# Patient Record
(demographics unavailable — no encounter records)

---

## 2024-10-25 NOTE — REASON FOR VISIT
[Consultation] : consultation for [FreeTextEntry2] : menstrual symptoms, dyspareunia [FreeTextEntry1] : Pamela Shay, NP

## 2024-10-25 NOTE — PHYSICAL EXAM
[Chaperone Present] : A chaperone was present in the examining room during all aspects of the physical examination [61932] : A chaperone was present during the pelvic exam. [Appropriately responsive] : appropriately responsive [Alert] : alert [No Acute Distress] : no acute distress [No Lymphadenopathy] : no lymphadenopathy [Soft] : soft [Non-tender] : non-tender [Non-distended] : non-distended [No HSM] : No HSM [No Lesions] : no lesions [No Mass] : no mass [Oriented x3] : oriented x3 [Labia Majora] : normal [Labia Minora] : normal [Normal] : normal [Uterine Adnexae] : normal [FreeTextEntry2] : ANIYA Gifford [FreeTextEntry4] : small amount thick white discharge [FreeTextEntry6] : 10 wk mobile uterus feels anteflexed with palpable fundus vs fibroid on anterior wall in area of concern

## 2024-10-25 NOTE — HISTORY OF PRESENT ILLNESS
[Y] : Positive pregnancy history [___] : #3 ([unfilled]): [Pregnancy History] :  delivery [Menarche Age: ____] : age at menarche was [unfilled] [Currently Active] : currently active [Men] : men [FreeTextEntry1] : LMP: 2024 pt is a 53 yo old  referred from Pamela Shay NP for surgical management vs medication management for adenomyosis. c/o monthly menses lasting 5-7 days, heavy 2 days changing pad every 2 hours. c/o dysmenorrhea starting 1 day before, worsening during menses, radiates around back and down legs, subsides with menses. c/o dyspareunia x 1 month, has some vaginal dryness but uses lubricant which helps.  Denies urinary or GI symptoms. Occasional urgency symptoms when coming home from work.   Pelvic/ TVUS [facility] (10/17/2024) A/V Ut, enlarged bulbous and overall heterogeneous. there was a 7mm cyst noted within myometrium. Also some areas of "venetian blind" appearance, which can be seen in adenomyosis. 2 Focal intramural fibroids noted (Fib 1 ANT, Fib 2 POST) Endo- approx 4.3mm ROV 2.7 cm clear appearing cyst within. LOV  wnl, KEVIN 4.3 mm  Restarted on Lo loestrin since .  Health maintenance  Lpap- (24) negative Lhpv- (24) not detected  Lmammo/breast US- (24) negative BIRADS 1 Benign Lcolonoscopy-    PObHx: c/s x4 (one at 25 wks  after birth) PGynHx: h/o abnormal pap, cervical dysplasia () Endometrial ablation () , +h/o fibroids/cysts  PMH: Bedolla''s esophagus PSH: LSC Cholecystectomy (2023), LSC Gastric bypass(2021), c/sx4 (,,) tubal ligation, appendectomy (),  cystic lymphangioma from neck removed FamHx: -Colon Cancer, Paternal aunt -Esophageal cancer: mother ( age 60) -DM, Father -Heart disease, Father SocialHx:  No ETOH/drug, former smoker  Medications: Lo loestrin, Omeprazole Allergies: NKDA [PGHxTotal] : 4 [[facility]] : 3 [PGHxABSpont] : 1 [FreeTextEntry3] : tubal ligation

## 2024-10-25 NOTE — HISTORY OF PRESENT ILLNESS
[Y] : Positive pregnancy history [___] : #3 ([unfilled]): [Pregnancy History] :  delivery [Menarche Age: ____] : age at menarche was [unfilled] [Currently Active] : currently active [Men] : men [FreeTextEntry1] : LMP: 2024 pt is a 55 yo old  referred from Pamela Shay NP for surgical management vs medication management for adenomyosis. c/o monthly menses lasting 5-7 days, heavy 2 days changing pad every 2 hours. c/o dysmenorrhea starting 1 day before, worsening during menses, radiates around back and down legs, subsides with menses. c/o dyspareunia x 1 month, has some vaginal dryness but uses lubricant which helps.  Denies urinary or GI symptoms. Occasional urgency symptoms when coming home from work.   Pelvic/ TVUS [facility] (10/17/2024) A/V Ut, enlarged bulbous and overall heterogeneous. there was a 7mm cyst noted within myometrium. Also some areas of "venetian blind" appearance, which can be seen in adenomyosis. 2 Focal intramural fibroids noted (Fib 1 ANT, Fib 2 POST) Endo- approx 4.3mm ROV 2.7 cm clear appearing cyst within. LOV  wnl, KEVIN 4.3 mm  Restarted on Lo loestrin since .  Health maintenance  Lpap- (24) negative Lhpv- (24) not detected  Lmammo/breast US- (24) negative BIRADS 1 Benign Lcolonoscopy-    PObHx: c/s x4 (one at 25 wks  after birth) PGynHx: h/o abnormal pap, cervical dysplasia () Endometrial ablation () , +h/o fibroids/cysts  PMH: Bedolla''s esophagus PSH: LSC Cholecystectomy (2023), LSC Gastric bypass(2021), c/sx4 (,,) tubal ligation, appendectomy (),  cystic lymphangioma from neck removed FamHx: -Colon Cancer, Paternal aunt -Esophageal cancer: mother ( age 60) -DM, Father -Heart disease, Father SocialHx:  No ETOH/drug, former smoker  Medications: Lo loestrin, Omeprazole Allergies: NKDA [PGHxTotal] : 4 [[facility]] : 3 [PGHxABSpont] : 1 [FreeTextEntry3] : tubal ligation

## 2024-10-25 NOTE — DISCUSSION/SUMMARY
[FreeTextEntry1] : 53 yo here for heavy menstrual bleeding, dysmenorrhea, fibroids, dyspareunia, b/l ovarian cysts.  1) Vaginal discharge:  Affirm  2) Menstrual symptoms, fibroids, b/l ovarian cysts, dyspareunia:  Physical exam findings reviewed with pt, possible dyspareunia secondary to palpable fundus vs fibroid anteflexed onto anterior wall GIven inconclusive ultrasound, recommend MRI pelvis for further evaluation Continue lubricant during intercourse  RTO after MRI for results and follow-up discussion. Will need EMBx, likely misoprostol given prior ablation.  I spent 50  minutes of total time on the day of the encounter preparing for the visit, review of records, interacting with the patient, EHR documentation, and coordinating care.

## 2024-10-25 NOTE — CONSULT LETTER
[Dear  ___] : Dear ~PANCHO, [FreeTextEntry1] : I had the pleasure of evaluating your patient, MATI HENAO. Please see my note enclosed for full details.   Thank you for allowing me to participate in the care of this patient. If you have any questions, please do not hesitate to contact me.   Sincerely,   Maci Harris MD, FACOG, [facility]  [address] [place] [address] Phone: [phone]  Fax: [phone]

## 2024-10-25 NOTE — CONSULT LETTER
[Dear  ___] : Dear ~PANCHO, [FreeTextEntry1] : I had the pleasure of evaluating your patient, MATI HENAO. Please see my note enclosed for full details.   Thank you for allowing me to participate in the care of this patient. If you have any questions, please do not hesitate to contact me.   Sincerely,   Maci Harris MD, FACOG, [facility]  [address] [place] [address] Phone: [phone]  Fax: [phone]

## 2024-10-25 NOTE — PHYSICAL EXAM
[Chaperone Present] : A chaperone was present in the examining room during all aspects of the physical examination [58404] : A chaperone was present during the pelvic exam. [Appropriately responsive] : appropriately responsive [Alert] : alert [No Acute Distress] : no acute distress [No Lymphadenopathy] : no lymphadenopathy [Soft] : soft [Non-tender] : non-tender [Non-distended] : non-distended [No HSM] : No HSM [No Lesions] : no lesions [No Mass] : no mass [Oriented x3] : oriented x3 [Labia Majora] : normal [Labia Minora] : normal [Normal] : normal [Uterine Adnexae] : normal [FreeTextEntry2] : ANIYA Gifford [FreeTextEntry4] : small amount thick white discharge [FreeTextEntry6] : 10 wk mobile uterus feels anteflexed with palpable fundus vs fibroid on anterior wall in area of concern

## 2024-10-25 NOTE — HISTORY OF PRESENT ILLNESS
[Y] : Positive pregnancy history [___] : #3 ([unfilled]): [Pregnancy History] :  delivery [Menarche Age: ____] : age at menarche was [unfilled] [Currently Active] : currently active [Men] : men [FreeTextEntry1] : LMP: 2024 pt is a 53 yo old  referred from Pamela Shay NP for surgical management vs medication management for adenomyosis. c/o monthly menses lasting 5-7 days, heavy 2 days changing pad every 2 hours. c/o dysmenorrhea starting 1 day before, worsening during menses, radiates around back and down legs, subsides with menses. c/o dyspareunia x 1 month, has some vaginal dryness but uses lubricant which helps.  Denies urinary or GI symptoms. Occasional urgency symptoms when coming home from work.   Pelvic/ TVUS [place] [facility] office (10/17/2024) A/V Ut, enlarged bulbous and overall heterogeneous. there was a 7mm cyst noted within myometrium. Also some areas of "venetian blind" appearance, which can be seen in adenomyosis. 2 Focal intramural fibroids noted (Fib 1 ANT, Fib 2 POST) Endo- approx 4.3mm ROV 2.7 cm clear appearing cyst within. LOV  wnl, KEVIN 4.3 mm  Restarted on Lo loestrin since .  Health maintenance  Lpap- (24) negative Lhpv- (24) not detected  Lmammo/breast US- (24) negative BIRADS 1 Benign Lcolonoscopy-    PObHx: c/s x4 (one at 25 wks  after birth) PGynHx: h/o abnormal pap, cervical dysplasia () Endometrial ablation () , +h/o fibroids/cysts  PMH: Bedolla''s esophagus PSH: LSC Cholecystectomy (2023), LSC Gastric bypass(2021), c/sx4 (,,) tubal ligation, appendectomy (),  cystic lymphangioma from neck removed FamHx: -Colon Cancer, Paternal aunt -Esophageal cancer: mother ( age 60) -DM, Father -Heart disease, Father SocialHx:  No ETOH/drug, former smoker  Medications: Lo loestrin, Omeprazole Allergies: NKDA [PGHxTotal] : 4 [[facility]] : 3 [PGHxABSpont] : 1 [FreeTextEntry3] : tubal ligation

## 2024-10-25 NOTE — CONSULT LETTER
[Dear  ___] : Dear ~PANCHO, [FreeTextEntry1] : I had the pleasure of evaluating your patient, MATI HENAO. Please see my note enclosed for full details.   Thank you for allowing me to participate in the care of this patient. If you have any questions, please do not hesitate to contact me.   Sincerely,   Maci Harris MD, FACOG, [facility]  [address] [place] [address] Phone: [phone]  Fax: [phone]

## 2024-10-25 NOTE — PHYSICAL EXAM
[Chaperone Present] : A chaperone was present in the examining room during all aspects of the physical examination [84116] : A chaperone was present during the pelvic exam. [Appropriately responsive] : appropriately responsive [Alert] : alert [No Acute Distress] : no acute distress [No Lymphadenopathy] : no lymphadenopathy [Soft] : soft [Non-tender] : non-tender [Non-distended] : non-distended [No HSM] : No HSM [No Lesions] : no lesions [No Mass] : no mass [Oriented x3] : oriented x3 [Labia Majora] : normal [Labia Minora] : normal [Normal] : normal [Uterine Adnexae] : normal [FreeTextEntry2] : ANIYA Gifford [FreeTextEntry4] : small amount thick white discharge [FreeTextEntry6] : 10 wk mobile uterus feels anteflexed with palpable fundus vs fibroid on anterior wall in area of concern

## 2024-11-15 NOTE — REASON FOR VISIT
[Follow-Up] : a follow-up evaluation of [Dysmenorrhea] : dysmenorrhea [Menorrhagia] : menorrhagia [Dyspareunia] : dyspareunia [FreeTextEntry2] : uterine leiomyoma

## 2024-11-15 NOTE — CONSULT LETTER
[Dear  ___] : Dear ~PANCHO, [Consult Letter:] : I had the pleasure of evaluating your patient, [unfilled]. [FreeTextEntry1] : I had the pleasure of evaluating your patient, MATI HENAO. Please see my note enclosed for full details.   Thank you for allowing me to participate in the care of this patient. If you have any questions, please do not hesitate to contact me.   Sincerely,   Maci Harris MD, FACOG, [facility]  [address] [place] [address] Phone: [phone]  Fax: [phone]

## 2024-11-15 NOTE — DISCUSSION/SUMMARY
[FreeTextEntry1] : 55 yo here for heavy menstrual bleeding, dysmenorrhea, fibroids, dyspareunia, ROV cysts.  -Medical and surgical options reviewed, pt considering her options -Return for Pr[facility] hysteroscopy/EMBx -Rx misoprostol to place prior to procedure -Will continue discussion of her options at next visit  I spent 48 minutes of total time on the day of the encounter preparing for the visit, review of records, interacting with the patient, EHR documentation, and coordinating care.

## 2024-11-15 NOTE — HISTORY OF PRESENT ILLNESS
[FreeTextEntry1] : Pt is a 55 yo here for heavy menstrual bleeding, dysmenorrhea, fibroids, dyspareunia, b/l ovarian cysts here for follow-up. No changes since last visit.   Pelvis MRI (10/31/24) [facility]- 1. Enlarged uterus 9.6 x 6.5 cm, with marked adenomyosis with cystic change and punctate foci of hemorrhagic change. 2. Nabothian cysts, largest up to 2.0 cm with hemorrhagic/proteinaceous change. 3. Several right ovarian cystic structures may represent follicular cysts, largest up to 2.8 cm. 4.  changes. 5. Anterior left pelvic wall 3.1 cm increased T1 signal nodule with mild postcontrast enhancement, likely endometrial implant. Correlation recommended  Images reviewed myself and findings reviewed with pt. Discussed many of her symptoms are likely related to adenomyosis, but she may also have endometriosis present. States dyspareunia is new over last month and feels like something is blocked during intercourse when she is upright and partner laying down. Reviewed possible etiology related to anatomic findings on exam as noted at last visit. Discussed vaginal dryness as component. Also discussed possible adenomyosis and endometriosis as contributing factors.   Discussed conserative management with pelvic floot PT for dyspareunia as well as surgical options of diagnostic laparoscopy with lysis of adhesions and trying to restore anatomy with plan for excision of endometriosis if present. We also discussed more definitive management with hysterectomy, bilateral salpingectomy, lysis of adhesions, possible excision of endometriosis if present, cystoscopy.   Given age and AUB, recommend office hysteroscopy/EMBx.

## 2024-11-15 NOTE — HISTORY OF PRESENT ILLNESS
[FreeTextEntry1] : Pt is a 53 yo here for heavy menstrual bleeding, dysmenorrhea, fibroids, dyspareunia, b/l ovarian cysts here for follow-up. No changes since last visit.   Pelvis MRI (10/31/24) [facility]- 1. Enlarged uterus 9.6 x 6.5 cm, with marked adenomyosis with cystic change and punctate foci of hemorrhagic change. 2. Nabothian cysts, largest up to 2.0 cm with hemorrhagic/proteinaceous change. 3. Several right ovarian cystic structures may represent follicular cysts, largest up to 2.8 cm. 4.  changes. 5. Anterior left pelvic wall 3.1 cm increased T1 signal nodule with mild postcontrast enhancement, likely endometrial implant. Correlation recommended  Images reviewed myself and findings reviewed with pt. Discussed many of her symptoms are likely related to adenomyosis, but she may also have endometriosis present. States dyspareunia is new over last month and feels like something is blocked during intercourse when she is upright and partner laying down. Reviewed possible etiology related to anatomic findings on exam as noted at last visit. Discussed vaginal dryness as component. Also discussed possible adenomyosis and endometriosis as contributing factors.   Discussed conserative management with pelvic floot PT for dyspareunia as well as surgical options of diagnostic laparoscopy with lysis of adhesions and trying to restore anatomy with plan for excision of endometriosis if present. We also discussed more definitive management with hysterectomy, bilateral salpingectomy, lysis of adhesions, possible excision of endometriosis if present, cystoscopy.   Given age and AUB, recommend office hysteroscopy/EMBx.

## 2024-11-15 NOTE — CONSULT LETTER
[Dear  ___] : Dear ~PANCHO, [Consult Letter:] : I had the pleasure of evaluating your patient, [unfilled]. [FreeTextEntry1] : I had the pleasure of evaluating your patient, MATI HENAO. Please see my note enclosed for full details.   Thank you for allowing me to participate in the care of this patient. If you have any questions, please do not hesitate to contact me.   Sincerely,   Maci Harris MD, FACOG, [facility]  [address] [place] [address] Phone: [phone]  Fax: [phone]

## 2024-11-15 NOTE — CONSULT LETTER
[Dear  ___] : Dear ~PANCHO, [Consult Letter:] : I had the pleasure of evaluating your patient, [unfilled]. [FreeTextEntry1] : I had the pleasure of evaluating your patient, MATI HENAO. Please see my note enclosed for full details.   Thank you for allowing me to participate in the care of this patient. If you have any questions, please do not hesitate to contact me.   Sincerely,   Maci Harris MD, FACOG, [facility]  [address] [place] [address] Phone: [phone]  Fax: [phone]

## 2024-11-25 NOTE — PROCEDURE
[Hysteroscopy] : Hysteroscopy [Risks] : risks [Benefits] : benefits [Alternatives] : alternatives [Patient] : patient [Infection] : infection [Pretreatment with misoprostol] : pretreatment with misoprostol [rigid] : Using aseptic technique a hysteroscopy was performed using a rigid hysteroscope [Sent to Pathology] : specimen was placed in buffered formalin and sent for pathology [Tolerated Well] : Patient tolerated the procedure well [Aftercare instructions/regstrictions given and follow-up scheduled] : Aftercare instructions/restrictions given and follow-up scheduled [de-identified] : heavy menstrual bleeding, dysmenorrhea, fibroids, dyspareunia, and ROV cysts. [de-identified] : Os finder used to try to manually dilate cervix, multiple attempts with os finder and pipelle as well as Luther dilator and unable to dilate cervix. Procedure aborted

## 2024-11-25 NOTE — CONSULT LETTER
[Dear  ___] : Dear ~PANCHO, [FreeTextEntry1] : I had the pleasure of evaluating your patient, MATI HENAO. Please see my note enclosed for full details.   Thank you for allowing me to participate in the care of this patient. If you have any questions, please do not hesitate to contact me.   Sincerely,   Maci Harris MD, FACOG, [facility]  [address] [place] [address] Phone: [phone]  Fax: [phone]

## 2024-11-25 NOTE — CONSULT LETTER
[Dear  ___] : Dear ~PANCHO, [FreeTextEntry1] : I had the pleasure of evaluating your patient, MATI HENAO. Please see my note enclosed for full details.   Thank you for allowing me to participate in the care of this patient. If you have any questions, please do not hesitate to contact me.   Sincerely,   Maci Harris MD, FACOG, [facility]  [address] [place] [address] Phone: [phone]  Fax: [phone]

## 2024-11-25 NOTE — DISCUSSION/SUMMARY
[FreeTextEntry1] : 55 yo here for heavy menstrual bleeding, dysmenorrhea, fibroids, dyspareunia, ROV cysts. Unable to complete hysteroscopy/EMBx due to cervical stenosis -Medical and surgical options reviewed, pt considering her options and will let us know what she decides -Discussed hysteroscopy D&C in OR in separate procedure to obtain tissue diagnosis vs proceeding with hysterectomy given inability to sample lining. Discussed risks and benefits of each.  -Tentative plan for pelvic EUA, robot-assisted laparoscopic hysterectomy, bilateral salpingectomy, right ovarian cystectomy, possible excision of endometriosis, cystoscopy with possible ICG injection, all indicated procedures.  -Will need medical clearance -She will contact us with how she would like to proceed -All questions answered to her satisfaction

## 2024-11-25 NOTE — PROCEDURE
[Hysteroscopy] : Hysteroscopy [Risks] : risks [Benefits] : benefits [Alternatives] : alternatives [Patient] : patient [Infection] : infection [Pretreatment with misoprostol] : pretreatment with misoprostol [rigid] : Using aseptic technique a hysteroscopy was performed using a rigid hysteroscope [Sent to Pathology] : specimen was placed in buffered formalin and sent for pathology [Tolerated Well] : Patient tolerated the procedure well [Aftercare instructions/regstrictions given and follow-up scheduled] : Aftercare instructions/restrictions given and follow-up scheduled [de-identified] : heavy menstrual bleeding, dysmenorrhea, fibroids, dyspareunia, and ROV cysts. [de-identified] : Os finder used to try to manually dilate cervix, multiple attempts with os finder and pipelle as well as Luther dilator and unable to dilate cervix. Procedure aborted

## 2024-11-25 NOTE — REASON FOR VISIT
[Follow-Up] : a follow-up evaluation of [FreeTextEntry2] : heavy menstrual bleeding, dysmenorrhea, fibroids, dyspareunia, and ROV cysts

## 2024-11-25 NOTE — CONSULT LETTER
[Dear  ___] : Dear ~PANCHO, [FreeTextEntry1] : I had the pleasure of evaluating your patient, MATI HENAO. Please see my note enclosed for full details.   Thank you for allowing me to participate in the care of this patient. If you have any questions, please do not hesitate to contact me.   Sincerely,   Maci Harris MD, FACOG, [facility]  [address] [place] [address] Phone: [phone]  Fax: [phone]

## 2024-11-25 NOTE — HISTORY OF PRESENT ILLNESS
[FreeTextEntry1] : Pt is a 55 yo with heavy menstrual bleeding, dysmenorrhea, fibroids, dyspareunia, and ROV cysts here today for in office hysteroscopy/ EMBx. Rx misoprostol ordered to place prior to procedure.

## 2024-11-25 NOTE — HISTORY OF PRESENT ILLNESS
[FreeTextEntry1] : Pt is a 53 yo with heavy menstrual bleeding, dysmenorrhea, fibroids, dyspareunia, and ROV cysts here today for in office hysteroscopy/ EMBx. Rx misoprostol ordered to place prior to procedure.

## 2024-11-25 NOTE — DISCUSSION/SUMMARY
[FreeTextEntry1] : 53 yo here for heavy menstrual bleeding, dysmenorrhea, fibroids, dyspareunia, ROV cysts. Unable to complete hysteroscopy/EMBx due to cervical stenosis -Medical and surgical options reviewed, pt considering her options and will let us know what she decides -Discussed hysteroscopy D&C in OR in separate procedure to obtain tissue diagnosis vs proceeding with hysterectomy given inability to sample lining. Discussed risks and benefits of each.  -Tentative plan for pelvic EUA, robot-assisted laparoscopic hysterectomy, bilateral salpingectomy, right ovarian cystectomy, possible excision of endometriosis, cystoscopy with possible ICG injection, all indicated procedures.  -Will need medical clearance -She will contact us with how she would like to proceed -All questions answered to her satisfaction

## 2024-11-25 NOTE — PROCEDURE
[Hysteroscopy] : Hysteroscopy [Risks] : risks [Benefits] : benefits [Alternatives] : alternatives [Patient] : patient [Infection] : infection [Pretreatment with misoprostol] : pretreatment with misoprostol [rigid] : Using aseptic technique a hysteroscopy was performed using a rigid hysteroscope [Sent to Pathology] : specimen was placed in buffered formalin and sent for pathology [Tolerated Well] : Patient tolerated the procedure well [Aftercare instructions/regstrictions given and follow-up scheduled] : Aftercare instructions/restrictions given and follow-up scheduled [de-identified] : heavy menstrual bleeding, dysmenorrhea, fibroids, dyspareunia, and ROV cysts. [de-identified] : Os finder used to try to manually dilate cervix, multiple attempts with os finder and pipelle as well as Luther dilator and unable to dilate cervix. Procedure aborted

## 2024-12-09 NOTE — HISTORY OF PRESENT ILLNESS
[FreeTextEntry1] : Pt is a 53 yo with heavy menstrual bleeding, dysmenorrhea, fibroids, dyspareunia and ROV cysts here today for follow-up. No changes since last visit.   Last seen in office on 11/21/24 attempted to do in office hysteroscopy/EMBx, unable to complete due to cervical stenosis. Medical and surgical options reviewed. Discussed attempting D&C with frozen section in OR then proceeding with hysterectomy given inability to sample lining in office.   Pt is scheduled for pelvic EUA, dilation and curettage, robot-assisted laparoscopic hysterectomy, bilateral salpingectomy, right ovarian cystectomy, possible excision of endometriosis, cystoscopy with possible ICG injection, all indicated procedures at [facility] on 12/17/24  Medical clearance- Dr Diane Taylor on Friday PST- 12/11/24  Discussed surgical plan for pelvic EUA, dilation and curettage, robot-assisted laparoscopic hysterectomy, bilateral salpingectomy, right ovarian cystectomy, possible excision of endometriosis, cystoscopy with possible ICG injection, all indicated procedures. Discussed risks to include, but are not limited to, bleeding, possible transfusion, infection, fistula, damage to nearby organs, vessels, or nerves resulting in temporary or permanent injury, deep venous thrombosis, and perioperative death. Discussed risk of laparotomy if unable to have adequate visualization to complete using minimally invasive approach. Discussed the possible need for contained extraction of the uterus in a bag in unable to remove vaginally. We reviewed the planned location of the incisions and I showed them to her on her abdomen.  She also understands the limitations of laparoscopic surgery and the possibility of missing a surgical complication with need for subsequent re-exploration. She also understands the rationale for a cystoscopy at the completion of the procedure and the potential risks of cystoscopy.   Postoperative pain management was reviewed with plan for alternating Tylenol and Oxycodone. Pt cannot take Motrin.  Discussed the benefit of ice packs x first 24 hours.  Discussed postoperative expectations and restrictions.   Discussed preoperative bowel prep, handout provided.   We reviewed that there will be physician assistants and nurse practitioners in the operating room who may assist in the pelvic exam for learning purposes and who will be assisting in the surgery.   She agrees to proceed. All of her questions were answered to her satisfaction.

## 2024-12-09 NOTE — REASON FOR VISIT
[Follow-Up] : a follow-up evaluation of [FreeTextEntry2] :  heavy menstrual bleeding, dysmenorrhea, fibroids, dyspareunia, and ROV cysts.

## 2024-12-09 NOTE — HISTORY OF PRESENT ILLNESS
[FreeTextEntry1] : Pt is a 53 yo with heavy menstrual bleeding, dysmenorrhea, fibroids, dyspareunia and ROV cysts here today for follow-up. No changes since last visit.   Last seen in office on 11/21/24 attempted to do in office hysteroscopy/EMBx, unable to complete due to cervical stenosis. Medical and surgical options reviewed. Discussed attempting D&C with frozen section in OR then proceeding with hysterectomy given inability to sample lining in office.   Pt is scheduled for pelvic EUA, dilation and curettage, robot-assisted laparoscopic hysterectomy, bilateral salpingectomy, right ovarian cystectomy, possible excision of endometriosis, cystoscopy with possible ICG injection, all indicated procedures at [facility] on 12/17/24  Medical clearance- Dr Diane Taylor on Friday PST- 12/11/24  Discussed surgical plan for pelvic EUA, dilation and curettage, robot-assisted laparoscopic hysterectomy, bilateral salpingectomy, right ovarian cystectomy, possible excision of endometriosis, cystoscopy with possible ICG injection, all indicated procedures. Discussed risks to include, but are not limited to, bleeding, possible transfusion, infection, fistula, damage to nearby organs, vessels, or nerves resulting in temporary or permanent injury, deep venous thrombosis, and perioperative death. Discussed risk of laparotomy if unable to have adequate visualization to complete using minimally invasive approach. Discussed the possible need for contained extraction of the uterus in a bag in unable to remove vaginally. We reviewed the planned location of the incisions and I showed them to her on her abdomen.  She also understands the limitations of laparoscopic surgery and the possibility of missing a surgical complication with need for subsequent re-exploration. She also understands the rationale for a cystoscopy at the completion of the procedure and the potential risks of cystoscopy.   Postoperative pain management was reviewed with plan for alternating Tylenol and Oxycodone. Pt cannot take Motrin.  Discussed the benefit of ice packs x first 24 hours.  Discussed postoperative expectations and restrictions.   Discussed preoperative bowel prep, handout provided.   We reviewed that there will be physician assistants and nurse practitioners in the operating room who may assist in the pelvic exam for learning purposes and who will be assisting in the surgery.   She agrees to proceed. All of her questions were answered to her satisfaction.

## 2024-12-09 NOTE — DISCUSSION/SUMMARY
[FreeTextEntry1] : 55 yo here for heavy menstrual bleeding, dysmenorrhea, fibroids, dyspareunia, ROV cysts. Unable to complete hysteroscopy/EMBx due to cervical stenosis -Plan for pelvic EUA, dilation and curettage, robot-assisted laparoscopic hysterectomy, bilateral salpingectomy, right ovarian cystectomy, possible excision of endometriosis, cystoscopy with possible ICG injection, all indicated procedures.  -Has PST and med clearance appts -Rx Oxycodone/colace sent -Rx Metronidazole and Neomycin sent with plan for preop bowel prep -Postoperative restrictions reviewed -All questions answered to her satisfaction  I spent 45 minutes of total time on the day of the encounter preparing for the visit, review of records, interacting with the patient, EHR documentation, and coordinating care.

## 2025-01-02 NOTE — PHYSICAL EXAM
[General Appearance - Alert] : alert [General Appearance - In No Acute Distress] : in no acute distress [General Appearance - Well Nourished] : well nourished [Sclera] : the sclera and conjunctiva were normal [Extraocular Movements] : extraocular movements were intact [Outer Ear] : the ears and nose were normal in appearance [Examination Of The Oral Cavity] : the lips and gums were normal [Neck Appearance] : the appearance of the neck was normal [Respiration, Rhythm And Depth] : normal respiratory rhythm and effort [Exaggerated Use Of Accessory Muscles For Inspiration] : no accessory muscle use [Auscultation Breath Sounds / Voice Sounds] : lungs were clear to auscultation bilaterally [Edema] : there was no peripheral edema [Bowel Sounds] : normal bowel sounds [Abdomen Soft] : soft [FreeTextEntry1] : + Sky catheter  [Musculoskeletal - Swelling] : no joint swelling [Range of Motion to Joints] : range of motion to joints [Skin Color & Pigmentation] : normal skin color and pigmentation [] : no rash [Sensation] : the sensory exam was normal to light touch and pinprick [Motor Exam] : the motor exam was normal [Oriented To Time, Place, And Person] : oriented to person, place, and time [Affect] : the affect was normal

## 2025-01-02 NOTE — HISTORY OF PRESENT ILLNESS
[FreeTextEntry1] : Patient is here for hospital follow up   55 y/o woman with GERD, Goran's esophagus, uterine adenomyosis ad recurrent bleeding s/ robotic assisted laparoscopic hysterectomy 12/17 - course complicated by developing fever and urine retention due to bladder injury s/p placement of watson catheter. CT also showed a pelvic collection and SC emphysema  She was treated with Abx with resolution of fever and leukocytosis   Repeat CT from yesterday noted -- Pelvic collection improved. SC emphysema resolved.   blood work reviewed with patient -- CBC and CMP unremarkable, CRP normal   She gets slight intermittent lower abdominal discomfort

## 2025-01-02 NOTE — REASON FOR VISIT
[Post Hospitalization] : a post hospitalization visit [FreeTextEntry1] : fu from hospital stay ([facility]) for UTI  IV Invanz 1g QD with [facility] infusion discuss bw results and CT results  pt is seeing GYN on 1/6 & CT bladder on 1/10

## 2025-01-02 NOTE — ASSESSMENT
[FreeTextEntry1] : 55 y/o woman with GERD, Goran's esophagus, uterine adenomyosis ad recurrent bleeding s/ robotic assisted laparoscopic hysterectomy 12/17 - course complicated by developing fever and urine retention due to bladder injury s/p placement of watson catheter. CT also showed a pelvic collection and SC emphysema  She was treated with Abx with resolution of fever and leukocytosis  Repeat CT from yesterday noted -- Pelvic collection improved. SC emphysema resolved.  discussed with Dr Harris who discussed it with radiology - minimal free fluid in pevlis, but collection itself resolved  Blood work reviewed with patient -- CBC and CMP unremarkable, CRP normal   Rec:  1. Complete last dose of IV Invanz tomorrow then DC Midline ( Orders called in to home infusion company)  2. Gyn follow up next week 3. IR follow up for cystogram 1/10  4. Watson per urology and IR - pending cystogram    Plan above d/w patient in detail. D/w Dr. Harris    [Treatment Education] : treatment education [Treatment Adherence] : treatment adherence [Rx Dose / Side Effects] : Rx dose/side effects

## 2025-01-02 NOTE — ASSESSMENT
[FreeTextEntry1] : 53 y/o woman with GERD, Goran's esophagus, uterine adenomyosis ad recurrent bleeding s/ robotic assisted laparoscopic hysterectomy 12/17 - course complicated by developing fever and urine retention due to bladder injury s/p placement of watson catheter. CT also showed a pelvic collection and SC emphysema  She was treated with Abx with resolution of fever and leukocytosis  Repeat CT from yesterday noted -- Pelvic collection improved. SC emphysema resolved.  discussed with Dr Harris who discussed it with radiology - minimal free fluid in pevlis, but collection itself resolved  Blood work reviewed with patient -- CBC and CMP unremarkable, CRP normal   Rec:  1. Complete last dose of IV Invanz tomorrow then DC Midline ( Orders called in to home infusion company)  2. Gyn follow up next week 3. IR follow up for cystogram 1/10  4. Watson per urology and IR - pending cystogram    Plan above d/w patient in detail. D/w Dr. Harris    [Treatment Education] : treatment education [Treatment Adherence] : treatment adherence [Rx Dose / Side Effects] : Rx dose/side effects

## 2025-01-10 NOTE — DISCUSSION/SUMMARY
[FreeTextEntry1] :  55 yo s/p Pelvic exam under anesthesia, dilation and curettage, robot assisted laparoscopic hysterectomy, bilateral salpingectomy, extensive lysis of adhesions, cystoscopy on 12/17/24 at [facility] complicated by readmission for cystotomy and pelvic collection.  -Continue watson until Friday, plan for cystogram at that time -Continue Macrobid until watson removed -s/p abx, recent CT shows resolution of pelvic collections, reviewed with radiologist -Postoperative restrictions reviewed -Will follow up with pt after IR appt Friday

## 2025-01-10 NOTE — PHYSICAL EXAM
[Chaperone Present] : A chaperone was present in the examining room during all aspects of the physical examination [57929] : A chaperone was present during the pelvic exam. [Appropriately responsive] : appropriately responsive [Alert] : alert [No Acute Distress] : no acute distress [No Lymphadenopathy] : no lymphadenopathy [Soft] : soft [Non-tender] : non-tender [Non-distended] : non-distended [No HSM] : No HSM [No Lesions] : no lesions [No Mass] : no mass [Oriented x3] : oriented x3 [Normal] : normal external genitalia [FreeTextEntry2] : ANIYA Jean  [FreeTextEntry7] : incisions c/d/i without erythema/drainage [FreeTextEntry1] : watson draining clear urine, prior stat lock removed and new placed on left thigh

## 2025-01-10 NOTE — HISTORY OF PRESENT ILLNESS
[FreeTextEntry1] :     Pt is a 53 yo s/p Pelvic exam under anesthesia, dilation and curettage, robot assisted laparoscopic hysterectomy, bilateral salpingectomy, extensive lysis of adhesions, cystoscopy on 12/17/24 at [facility]. Indication: AUB, adenomyosis, dysmenorrhea, dyspareunia, right ovarian cysts. Pt readmitted POD2 with urinary retention, found to have cystotomy, watson placed. Developed intermittent fevers and pelvic collection, followed by ID and was on IV abx, discharged home 12/23  completed abx x 2 weeks. Currently on prophylactic macrobid.   Pt is 2W6D post op, presents here today in office for a post-op evaluation and review pathology. She is recovering well from her surgery. Pain well-controlled, tolerating regular diet, ambulating, normal BM. Watson in place draining clear urine. Denies vaginal bleeding. Denies fevers, abnormal discharge. Requests change of stat lock today as was irritating her last week.   PERTINENT FINDINGS:  Pelvic exam under anesthesia revealed a 13-week anteverted uterus with thickened area palpated on the anterior vaginal wall, no adnexal masses.  Dilation and curettage performed at the start of the procedure  and endometrial curettings sent for frozen section revealing benign pathology.  Intraoperative findings included a large omental adhesion to the anterior abdominal wall, a 13 cm globular uterus, normal bilateral ovaries.  Evidence of prior tubal ligation  bilaterally; otherwise, normal-appearing fallopian tubes.  Extensive dense adhesions of the bladder to the lower uterine segment.  There was no evidence of endometriosis noted on a full pelvic and abdominal survey.  Cystoscopy findings revealed a normal-appearing  bladder without defects or foreign body material as well as bilateral brisk ureteral jets.   Pathology: 1. Endometrium , curetting  - Endo and ectocervical fragments , unremarkable - Blood, fibrin clot, histiocytes and acute inflammatatory debris present - No endometrial mucosa seen.  2. Cyst, uterosacral ligament, robot assisted laparoscopic excision - Mesothelial cyst - Negative for atypia or malignancy  3. Uterus and cervix,  robot assisted laparoscopic  hysterectomy with bilateral salpingectomy - 174 Gram uterus - Serosa: Unremarkable - Cervix: Nabothian cysts, negative for dysplasia - Endometrium:  Weakly proliferative , negative for hyperplasia - Myometrium: Adenomyosis, extensive / deep, with Adenomyomas - Fallopian tube segments: Complete cros sections seen. Microscopic serosal adhesions, paratubal and serosal cysts and walthard cell rests. - Negative for atypia or malignancy  Intraoperative and pathology findings reviewed with pt, all questions answered

## 2025-01-10 NOTE — CONSULT LETTER
[Dear  ___] : Dear ~PANCHO, [FreeTextEntry1] : I had the pleasure of evaluating your patient, MATI HENAO. Please see my note enclosed for full details.   Thank you for allowing me to participate in the care of this patient. If you have any questions, please do not hesitate to contact me.   Sincerely,   Maci Harris MD, FACOG, [facility]  [address] [place] [address] Phone: [phone]  Fax: [phone]

## 2025-01-10 NOTE — PHYSICAL EXAM
[Chaperone Present] : A chaperone was present in the examining room during all aspects of the physical examination [15268] : A chaperone was present during the pelvic exam. [Appropriately responsive] : appropriately responsive [Alert] : alert [No Acute Distress] : no acute distress [No Lymphadenopathy] : no lymphadenopathy [Soft] : soft [Non-tender] : non-tender [Non-distended] : non-distended [No HSM] : No HSM [No Lesions] : no lesions [No Mass] : no mass [Oriented x3] : oriented x3 [Normal] : normal external genitalia [FreeTextEntry2] : ANIYA Jean  [FreeTextEntry7] : incisions c/d/i without erythema/drainage [FreeTextEntry1] : watson draining clear urine, prior stat lock removed and new placed on left thigh

## 2025-01-10 NOTE — CONSULT LETTER
[Dear  ___] : Dear ~PANCHO, [FreeTextEntry1] : I had the pleasure of evaluating your patient, MATI HENAO. Please see my note enclosed for full details.   Thank you for allowing me to participate in the care of this patient. If you have any questions, please do not hesitate to contact me.   Sincerely,   Maci Harris MD, FACOG, [facility]  [address] [place] [address] Phone: [phone]  Fax: [phone]

## 2025-01-10 NOTE — DISCUSSION/SUMMARY
----- Message from Ale Wiggins sent at 12/20/2018 12:29 PM CST -----  Contact: self/ [phone]  Pt requesting call back from staff. Says he is waiting on refills for:    [temazepam (RESTORIL) 30 mg capsule]  And  [predniSONE (DELTASONE) 10 MG tablet].    Says he needs refills before end of year. Says his insurance will change and medications will cost more. Please call with status. Thank you.  .  [facility] [address] [place] [address] [place] - [address] AT [facility] [place]  [address]  [place] [address]  Phone: [phone] Fax: [phone]   [FreeTextEntry1] :  53 yo s/p Pelvic exam under anesthesia, dilation and curettage, robot assisted laparoscopic hysterectomy, bilateral salpingectomy, extensive lysis of adhesions, cystoscopy on 12/17/24 at [facility] complicated by readmission for cystotomy and pelvic collection.  -Continue watson until Friday, plan for cystogram at that time -Continue Macrobid until watson removed -s/p abx, recent CT shows resolution of pelvic collections, reviewed with radiologist -Postoperative restrictions reviewed -Will follow up with pt after IR appt Friday

## 2025-01-10 NOTE — REASON FOR VISIT
[Post-Op Visit] : a post-op visit for [FreeTextEntry2] : Pelvic exam under anesthesia, dilation and curettage, robot assisted laparoscopic hysterectomy, bilateral salpingectomy, extensive lysis of adhesions, cystoscopy.

## 2025-01-10 NOTE — HISTORY OF PRESENT ILLNESS
[FreeTextEntry1] :     Pt is a 53 yo s/p Pelvic exam under anesthesia, dilation and curettage, robot assisted laparoscopic hysterectomy, bilateral salpingectomy, extensive lysis of adhesions, cystoscopy on 12/17/24 at [facility]. Indication: AUB, adenomyosis, dysmenorrhea, dyspareunia, right ovarian cysts. Pt readmitted POD2 with urinary retention, found to have cystotomy, watson placed. Developed intermittent fevers and pelvic collection, followed by ID and was on IV abx, discharged home 12/23  completed abx x 2 weeks. Currently on prophylactic macrobid.   Pt is 2W6D post op, presents here today in office for a post-op evaluation and review pathology. She is recovering well from her surgery. Pain well-controlled, tolerating regular diet, ambulating, normal BM. Watson in place draining clear urine. Denies vaginal bleeding. Denies fevers, abnormal discharge. Requests change of stat lock today as was irritating her last week.   PERTINENT FINDINGS:  Pelvic exam under anesthesia revealed a 13-week anteverted uterus with thickened area palpated on the anterior vaginal wall, no adnexal masses.  Dilation and curettage performed at the start of the procedure  and endometrial curettings sent for frozen section revealing benign pathology.  Intraoperative findings included a large omental adhesion to the anterior abdominal wall, a 13 cm globular uterus, normal bilateral ovaries.  Evidence of prior tubal ligation  bilaterally; otherwise, normal-appearing fallopian tubes.  Extensive dense adhesions of the bladder to the lower uterine segment.  There was no evidence of endometriosis noted on a full pelvic and abdominal survey.  Cystoscopy findings revealed a normal-appearing  bladder without defects or foreign body material as well as bilateral brisk ureteral jets.   Pathology: 1. Endometrium , curetting  - Endo and ectocervical fragments , unremarkable - Blood, fibrin clot, histiocytes and acute inflammatatory debris present - No endometrial mucosa seen.  2. Cyst, uterosacral ligament, robot assisted laparoscopic excision - Mesothelial cyst - Negative for atypia or malignancy  3. Uterus and cervix,  robot assisted laparoscopic  hysterectomy with bilateral salpingectomy - 174 Gram uterus - Serosa: Unremarkable - Cervix: Nabothian cysts, negative for dysplasia - Endometrium:  Weakly proliferative , negative for hyperplasia - Myometrium: Adenomyosis, extensive / deep, with Adenomyomas - Fallopian tube segments: Complete cros sections seen. Microscopic serosal adhesions, paratubal and serosal cysts and walthard cell rests. - Negative for atypia or malignancy  Intraoperative and pathology findings reviewed with pt, all questions answered

## 2025-01-10 NOTE — PHYSICAL EXAM
[Chaperone Present] : A chaperone was present in the examining room during all aspects of the physical examination [29077] : A chaperone was present during the pelvic exam. [Appropriately responsive] : appropriately responsive [Alert] : alert [No Acute Distress] : no acute distress [No Lymphadenopathy] : no lymphadenopathy [Soft] : soft [Non-tender] : non-tender [Non-distended] : non-distended [No HSM] : No HSM [No Lesions] : no lesions [No Mass] : no mass [Oriented x3] : oriented x3 [Normal] : normal external genitalia [FreeTextEntry2] : ANIYA Jean  [FreeTextEntry7] : incisions c/d/i without erythema/drainage [FreeTextEntry1] : watson draining clear urine, prior stat lock removed and new placed on left thigh

## 2025-01-10 NOTE — HISTORY OF PRESENT ILLNESS
[FreeTextEntry1] :     Pt is a 53 yo s/p Pelvic exam under anesthesia, dilation and curettage, robot assisted laparoscopic hysterectomy, bilateral salpingectomy, extensive lysis of adhesions, cystoscopy on 12/17/24 at [facility]. Indication: AUB, adenomyosis, dysmenorrhea, dyspareunia, right ovarian cysts. Pt readmitted POD2 with urinary retention, found to have cystotomy, watson placed. Developed intermittent fevers and pelvic collection, followed by ID and was on IV abx, discharged home 12/23  completed abx x 2 weeks. Currently on prophylactic macrobid.   Pt is 2W6D post op, presents here today in office for a post-op evaluation and review pathology. She is recovering well from her surgery. Pain well-controlled, tolerating regular diet, ambulating, normal BM. Watson in place draining clear urine. Denies vaginal bleeding. Denies fevers, abnormal discharge. Requests change of stat lock today as was irritating her last week.   PERTINENT FINDINGS:  Pelvic exam under anesthesia revealed a 13-week anteverted uterus with thickened area palpated on the anterior vaginal wall, no adnexal masses.  Dilation and curettage performed at the start of the procedure  and endometrial curettings sent for frozen section revealing benign pathology.  Intraoperative findings included a large omental adhesion to the anterior abdominal wall, a 13 cm globular uterus, normal bilateral ovaries.  Evidence of prior tubal ligation  bilaterally; otherwise, normal-appearing fallopian tubes.  Extensive dense adhesions of the bladder to the lower uterine segment.  There was no evidence of endometriosis noted on a full pelvic and abdominal survey.  Cystoscopy findings revealed a normal-appearing  bladder without defects or foreign body material as well as bilateral brisk ureteral jets.   Pathology: 1. Endometrium , curetting  - Endo and ectocervical fragments , unremarkable - Blood, fibrin clot, histiocytes and acute inflammatatory debris present - No endometrial mucosa seen.  2. Cyst, uterosacral ligament, robot assisted laparoscopic excision - Mesothelial cyst - Negative for atypia or malignancy  3. Uterus and cervix,  robot assisted laparoscopic  hysterectomy with bilateral salpingectomy - 174 Gram uterus - Serosa: Unremarkable - Cervix: Nabothian cysts, negative for dysplasia - Endometrium:  Weakly proliferative , negative for hyperplasia - Myometrium: Adenomyosis, extensive / deep, with Adenomyomas - Fallopian tube segments: Complete cros sections seen. Microscopic serosal adhesions, paratubal and serosal cysts and walthard cell rests. - Negative for atypia or malignancy  Intraoperative and pathology findings reviewed with pt, all questions answered

## 2025-01-10 NOTE — DISCUSSION/SUMMARY
[FreeTextEntry1] :  55 yo s/p Pelvic exam under anesthesia, dilation and curettage, robot assisted laparoscopic hysterectomy, bilateral salpingectomy, extensive lysis of adhesions, cystoscopy on 12/17/24 at [facility] complicated by readmission for cystotomy and pelvic collection.  -Continue watson until Friday, plan for cystogram at that time -Continue Macrobid until watson removed -s/p abx, recent CT shows resolution of pelvic collections, reviewed with radiologist -Postoperative restrictions reviewed -Will follow up with pt after IR appt Friday

## 2025-01-10 NOTE — CONSULT LETTER
[Dear  ___] : Dear ~PANCHO, [FreeTextEntry1] : I had the pleasure of evaluating your patient, MATI HENAO. Please see my note enclosed for full details.   Thank you for allowing me to participate in the care of this patient. If you have any questions, please do not hesitate to contact me.   Sincerely,   Maci Harris MD, FACOG, [facility]  [address] [place] [address] Phone: [phone]  Fax: [phone]  Need for prophylactic vaccination against rabies

## 2025-02-04 NOTE — CONSULT LETTER
[Dear  ___] : Dear ~PANCHO, [FreeTextEntry1] : I had the pleasure of evaluating your patient, MATI HENAO. Please see my note enclosed for full details.   Thank you for allowing me to participate in the care of this patient. If you have any questions, please do not hesitate to contact me.   Sincerely,   Maci Harris MD, FACOG, [facility]  [address] [place] [address] Phone: [phone]  Fax: [phone]

## 2025-02-04 NOTE — PHYSICAL EXAM
[Chaperone Present] : A chaperone was present in the examining room during all aspects of the physical examination [33469] : A chaperone was present during the pelvic exam. [FreeTextEntry2] : ANIYA Jean  [Appropriately responsive] : appropriately responsive [Alert] : alert [No Acute Distress] : no acute distress [No Lymphadenopathy] : no lymphadenopathy [Soft] : soft [Non-tender] : non-tender [Non-distended] : non-distended [No HSM] : No HSM [No Lesions] : no lesions [No Mass] : no mass [Oriented x3] : oriented x3 [FreeTextEntry7] : incisions c/d/i without erythema/drainage [Labia Majora] : normal [Labia Minora] : normal [Normal] : normal [Absent] : absent [Uterine Adnexae] : normal [FreeTextEntry4] : vaginal cuff intact, suture visualized. small amount thin white/yellow discharge.

## 2025-02-04 NOTE — CONSULT LETTER
[Dear  ___] : Dear ~PANCHO, [FreeTextEntry1] : I had the pleasure of evaluating your patient, MATI HENAO. Please see my note enclosed for full details.   Thank you for allowing me to participate in the care of this patient. If you have any questions, please do not hesitate to contact me.   Sincerely,   Maci Harris MD, FACOG, [facility]  [address] [place] [address] Phone: [phone]  Fax: [phone]

## 2025-02-04 NOTE — DISCUSSION/SUMMARY
[FreeTextEntry1] : 55 yo s/p robot assisted laparoscopic hysterectomy, bilateral salpingectomy, extensive lysis of adhesions, cystoscopy on 12/17/24 complicated by postoperative cystotomy/pelvic collection managed with prolonged watson and antibiotics. Watson removed 1/10 after cystogram with normal findings. Repeat CT showed resolution of prior pelvic collection.  -Discharge: Affirm  -RLQ discomfort: no abnormalities on exam, possibly musculoskeletal, recommend NSAID/heating pad/rest. If does not resolve over next week, recommend further evaluation possible ultrasound or CT.  -Can resume lifting and exercise as tolerated -No intercourse for 12 weeks post surgery -RTO 5 wks for follow-up

## 2025-02-04 NOTE — HISTORY OF PRESENT ILLNESS
[FreeTextEntry1] : LMP: HYST  Pt is a 53 yo s/p Pelvic exam under anesthesia, dilation and curettage, robot assisted laparoscopic hysterectomy, bilateral salpingectomy, extensive lysis of adhesions, cystoscopy on 12/17/24 at [facility], complicated by readmission for cystotomy and pelvic collection. She is 6W6D post op today, she reports to the office today for cuff check.  She was last seen in office on 1/6/24 first post op visit. She was with watson and prophylactic macrobid, due to have watson removed at the end of the week followed by cystogram. Recent CT showed resolution of pelvic collections, reviewed with radiologist. Postoperative restrictions reviewed with pt.   Interval History:  Pt had retrograde cystogram on 1/10/25 without complications. From procedure note: No evidence of contrast extravasation to suggest bladder leakageThe bladder was evacuated. Post void images without evidence of contrast extravasation Watson was removed.  Of Note: I spoke with pt via telephone after procedure, Pt feeling well, denies symptoms currently. All questions answered.   Pt has continued voiding spontaneously. Previously had some bladder discomfort when bladder was full, has since resolved. For last 2 days, reports some RLQ tenderness. States she did  her dog 2 days ago. Tolerating regular diet, ambulating, voiding, normal BM. Denies vaginal bleeding.

## 2025-02-04 NOTE — HISTORY OF PRESENT ILLNESS
[FreeTextEntry1] : LMP: HYST  Pt is a 55 yo s/p Pelvic exam under anesthesia, dilation and curettage, robot assisted laparoscopic hysterectomy, bilateral salpingectomy, extensive lysis of adhesions, cystoscopy on 12/17/24 at [facility], complicated by readmission for cystotomy and pelvic collection. She is 6W6D post op today, she reports to the office today for cuff check.  She was last seen in office on 1/6/24 first post op visit. She was with watson and prophylactic macrobid, due to have watson removed at the end of the week followed by cystogram. Recent CT showed resolution of pelvic collections, reviewed with radiologist. Postoperative restrictions reviewed with pt.   Interval History:  Pt had retrograde cystogram on 1/10/25 without complications. From procedure note: No evidence of contrast extravasation to suggest bladder leakageThe bladder was evacuated. Post void images without evidence of contrast extravasation Watson was removed.  Of Note: I spoke with pt via telephone after procedure, Pt feeling well, denies symptoms currently. All questions answered.   Pt has continued voiding spontaneously. Previously had some bladder discomfort when bladder was full, has since resolved. For last 2 days, reports some RLQ tenderness. States she did  her dog 2 days ago. Tolerating regular diet, ambulating, voiding, normal BM. Denies vaginal bleeding.

## 2025-02-04 NOTE — PHYSICAL EXAM
[Chaperone Present] : A chaperone was present in the examining room during all aspects of the physical examination [[id number]] : A chaperone was present during the pelvic exam. [FreeTextEntry2] : ANIYA Jean  [Appropriately responsive] : appropriately responsive [Alert] : alert [No Acute Distress] : no acute distress [No Lymphadenopathy] : no lymphadenopathy [Soft] : soft [Non-tender] : non-tender [Non-distended] : non-distended [No HSM] : No HSM [No Lesions] : no lesions [No Mass] : no mass [Oriented x3] : oriented x3 [FreeTextEntry7] : incisions c/d/i without erythema/drainage [Labia Majora] : normal [Labia Minora] : normal [Normal] : normal [Absent] : absent [Uterine Adnexae] : normal [FreeTextEntry4] : vaginal cuff intact, suture visualized. small amount thin white/yellow discharge.

## 2025-02-04 NOTE — CONSULT LETTER
[Dear  ___] : Dear ~PANCHO, [FreeTextEntry1] : I had the pleasure of evaluating your patient, MATI HENAO. Please see my note enclosed for full details.   Thank you for allowing me to participate in the care of this patient. If you have any questions, please do not hesitate to contact me.   Sincerely,   Maic Harris MD, FACOG, [facility]  [address] [place] [address] Phone: [phone]  Fax: [phone]  Airborne+Contact precautions

## 2025-02-04 NOTE — HISTORY OF PRESENT ILLNESS
[FreeTextEntry1] : LMP: HYST  Pt is a 53 yo s/p Pelvic exam under anesthesia, dilation and curettage, robot assisted laparoscopic hysterectomy, bilateral salpingectomy, extensive lysis of adhesions, cystoscopy on 12/17/24 at [facility], complicated by readmission for cystotomy and pelvic collection. She is 6W6D post op today, she reports to the office today for cuff check.  She was last seen in office on 1/6/24 first post op visit. She was with watson and prophylactic macrobid, due to have watson removed at the end of the week followed by cystogram. Recent CT showed resolution of pelvic collections, reviewed with radiologist. Postoperative restrictions reviewed with pt.   Interval History:  Pt had retrograde cystogram on 1/10/25 without complications. From procedure note: No evidence of contrast extravasation to suggest bladder leakageThe bladder was evacuated. Post void images without evidence of contrast extravasation Watson was removed.  Of Note: I spoke with pt via telephone after procedure, Pt feeling well, denies symptoms currently. All questions answered.   Pt has continued voiding spontaneously. Previously had some bladder discomfort when bladder was full, has since resolved. For last 2 days, reports some RLQ tenderness. States she did  her dog 2 days ago. Tolerating regular diet, ambulating, voiding, normal BM. Denies vaginal bleeding.

## 2025-02-04 NOTE — DISCUSSION/SUMMARY
[FreeTextEntry1] : 53 yo s/p robot assisted laparoscopic hysterectomy, bilateral salpingectomy, extensive lysis of adhesions, cystoscopy on 12/17/24 complicated by postoperative cystotomy/pelvic collection managed with prolonged watson and antibiotics. Watson removed 1/10 after cystogram with normal findings. Repeat CT showed resolution of prior pelvic collection.  -Discharge: Affirm  -RLQ discomfort: no abnormalities on exam, possibly musculoskeletal, recommend NSAID/heating pad/rest. If does not resolve over next week, recommend further evaluation possible ultrasound or CT.  -Can resume lifting and exercise as tolerated -No intercourse for 12 weeks post surgery -RTO 5 wks for follow-up

## 2025-03-10 NOTE — DISCUSSION/SUMMARY
[FreeTextEntry1] : Pt is a 55 yo s/p Robot assisted laparoscopic hysterectomy, bilateral salpingectomy, extensive lysis of adhesions, cystoscopy on 12/17/24 complicated by postoperative cystotomy/pelvic collection managed with prolonged watson and antibiotics. Pt doing well postoperatively.  -Can resume intercourse after 12 weeks -Lubricant prn  -Monitor symptoms, return if recur -Resume usual GYN care -All questions answered to her satisfaction

## 2025-03-10 NOTE — HISTORY OF PRESENT ILLNESS
[FreeTextEntry1] : Pt is a 55 yo s/p Robot assisted laparoscopic hysterectomy, bilateral salpingectomy, extensive lysis of adhesions, cystoscopy on 12/17/24 complicated by postoperative cystotomy/pelvic collection managed with prolonged watson and antibiotics. Watson removed 1/10/25 after cystogram with normal findings. Repeat CT showed resolution of prior pelvic collection. She is now 11W6D , here today for post op visit.   Denies pain. No complaints today.

## 2025-03-10 NOTE — PHYSICAL EXAM
[Chaperone Present] : A chaperone was present in the examining room during all aspects of the physical examination [FreeTextEntry2] : ANIYA Jean  [Appropriately responsive] : appropriately responsive [Alert] : alert [No Acute Distress] : no acute distress [No Lymphadenopathy] : no lymphadenopathy [Soft] : soft [Non-tender] : non-tender [Non-distended] : non-distended [No HSM] : No HSM [No Lesions] : no lesions [No Mass] : no mass [Oriented x3] : oriented x3 [FreeTextEntry7] : incisions well-healed [Labia Majora] : normal [Labia Minora] : normal [Normal] : normal [Absent] : absent [Uterine Adnexae] : normal [FreeTextEntry4] : vaginal cuff intact, suture visualized.

## 2025-03-10 NOTE — CONSULT LETTER
[Dear  ___] : Dear ~PANCHO, [FreeTextEntry1] : I had the pleasure of evaluating your patient, MATI HENAO. Please see my note enclosed for full details.   Thank you for allowing me to participate in the care of this patient. If you have any questions, please do not hesitate to contact me.   Sincerely,   Maci Harris MD, FACOG, [facility]  [address] [place] [address] Phone: [phone]  Fax: [phone]

## 2025-03-10 NOTE — CONSULT LETTER
[Dear  ___] : Dear ~PANCHO, [FreeTextEntry1] : I had the pleasure of evaluating your patient, MATI HENAO. Please see my note enclosed for full details.   Thank you for allowing me to participate in the care of this patient. If you have any questions, please do not hesitate to contact me.   Sincerely,   Maci Harris MD, FACOG, [facility]  [address] [place] [address] Phone: [phone]  Fax: [phone]

## 2025-03-10 NOTE — DISCUSSION/SUMMARY
[FreeTextEntry1] : Pt is a 53 yo s/p Robot assisted laparoscopic hysterectomy, bilateral salpingectomy, extensive lysis of adhesions, cystoscopy on 12/17/24 complicated by postoperative cystotomy/pelvic collection managed with prolonged watson and antibiotics. Pt doing well postoperatively.  -Can resume intercourse after 12 weeks -Lubricant prn  -Monitor symptoms, return if recur -Resume usual GYN care -All questions answered to her satisfaction

## 2025-03-10 NOTE — HISTORY OF PRESENT ILLNESS
[FreeTextEntry1] : Pt is a 53 yo s/p Robot assisted laparoscopic hysterectomy, bilateral salpingectomy, extensive lysis of adhesions, cystoscopy on 12/17/24 complicated by postoperative cystotomy/pelvic collection managed with prolonged watson and antibiotics. Watson removed 1/10/25 after cystogram with normal findings. Repeat CT showed resolution of prior pelvic collection. She is now 11W6D , here today for post op visit.   Denies pain. No complaints today.

## 2025-07-16 NOTE — PHYSICAL EXAM
[Chaperone Present] : A chaperone was present in the examining room during all aspects of the physical examination [11499] : A chaperone was present during the pelvic exam. [FreeTextEntry2] : ANIYA Jean  [Appropriately responsive] : appropriately responsive [Alert] : alert [No Acute Distress] : no acute distress [No Lymphadenopathy] : no lymphadenopathy [Soft] : soft [Non-tender] : non-tender [Non-distended] : non-distended [No HSM] : No HSM [No Lesions] : no lesions [No Mass] : no mass [Oriented x3] : oriented x3 [FreeTextEntry7] : incisions c/d/i without erythema/drainage [Labia Majora] : normal [Labia Minora] : normal [Normal] : normal [Absent] : absent [Uterine Adnexae] : normal [FreeTextEntry4] : vaginal cuff intact, suture visualized. small amount thin white/yellow discharge. 77